# Patient Record
Sex: FEMALE | Race: BLACK OR AFRICAN AMERICAN | Employment: UNEMPLOYED | URBAN - METROPOLITAN AREA
[De-identification: names, ages, dates, MRNs, and addresses within clinical notes are randomized per-mention and may not be internally consistent; named-entity substitution may affect disease eponyms.]

---

## 2020-12-04 ENCOUNTER — HOSPITAL ENCOUNTER (EMERGENCY)
Age: 60
Discharge: HOME | End: 2020-12-04
Attending: EMERGENCY MEDICINE
Payer: COMMERCIAL

## 2020-12-04 VITALS
RESPIRATION RATE: 20 BRPM | BODY MASS INDEX: 23.7 KG/M2 | WEIGHT: 160 LBS | HEIGHT: 69 IN | HEART RATE: 76 BPM | TEMPERATURE: 98.9 F | OXYGEN SATURATION: 100 % | SYSTOLIC BLOOD PRESSURE: 133 MMHG | DIASTOLIC BLOOD PRESSURE: 100 MMHG

## 2020-12-04 PROCEDURE — 6370000000 HC RX 637 (ALT 250 FOR IP): Performed by: STUDENT IN AN ORGANIZED HEALTH CARE EDUCATION/TRAINING PROGRAM

## 2020-12-04 PROCEDURE — 99283 EMERGENCY DEPT VISIT LOW MDM: CPT

## 2020-12-04 RX ORDER — NALOXONE HYDROCHLORIDE 4 MG/.1ML
1 SPRAY NASAL PRN
Status: DISCONTINUED | OUTPATIENT
Start: 2020-12-04 | End: 2020-12-05 | Stop reason: HOSPADM

## 2020-12-04 RX ORDER — CLONIDINE HYDROCHLORIDE 0.1 MG/1
0.2 TABLET ORAL ONCE
Status: COMPLETED | OUTPATIENT
Start: 2020-12-04 | End: 2020-12-04

## 2020-12-04 RX ORDER — ONDANSETRON 4 MG/1
4 TABLET, ORALLY DISINTEGRATING ORAL ONCE
Status: COMPLETED | OUTPATIENT
Start: 2020-12-04 | End: 2020-12-04

## 2020-12-04 RX ORDER — ONDANSETRON 2 MG/ML
4 INJECTION INTRAMUSCULAR; INTRAVENOUS ONCE
Status: DISCONTINUED | OUTPATIENT
Start: 2020-12-04 | End: 2020-12-04

## 2020-12-04 RX ORDER — CLONIDINE HYDROCHLORIDE 0.1 MG/1
0.1 TABLET ORAL ONCE
Status: COMPLETED | OUTPATIENT
Start: 2020-12-04 | End: 2020-12-04

## 2020-12-04 RX ADMIN — CLONIDINE HYDROCHLORIDE 0.1 MG: 0.1 TABLET ORAL at 17:46

## 2020-12-04 RX ADMIN — ONDANSETRON 4 MG: 4 TABLET, ORALLY DISINTEGRATING ORAL at 20:00

## 2020-12-04 RX ADMIN — NALOXONE HYDROCHLORIDE 1 SPRAY: 4 SPRAY NASAL at 23:01

## 2020-12-04 RX ADMIN — CLONIDINE HYDROCHLORIDE 0.2 MG: 0.1 TABLET ORAL at 20:00

## 2020-12-04 RX ADMIN — ONDANSETRON 4 MG: 4 TABLET, ORALLY DISINTEGRATING ORAL at 17:30

## 2020-12-04 ASSESSMENT — ENCOUNTER SYMPTOMS
CONSTIPATION: 0
RHINORRHEA: 1
DIARRHEA: 0
SORE THROAT: 0
BACK PAIN: 0
ABDOMINAL PAIN: 1
SHORTNESS OF BREATH: 0
COUGH: 0
VOMITING: 1
NAUSEA: 1

## 2020-12-04 NOTE — ED PROVIDER NOTES
She is ill-appearing. HENT:      Head: Normocephalic and atraumatic. Nose: Rhinorrhea present. Mouth/Throat:      Pharynx: Oropharynx is clear. No oropharyngeal exudate or posterior oropharyngeal erythema. Eyes:      General:         Right eye: No discharge. Left eye: No discharge. Extraocular Movements: Extraocular movements intact. Pupils: Pupils are equal, round, and reactive to light. Neck:      Musculoskeletal: Normal range of motion and neck supple. No muscular tenderness. Cardiovascular:      Rate and Rhythm: Normal rate and regular rhythm. Pulmonary:      Effort: Pulmonary effort is normal.      Breath sounds: Normal breath sounds. Abdominal:      General: There is no distension. Palpations: There is no mass. Tenderness: There is abdominal tenderness. There is no guarding. Musculoskeletal: Normal range of motion. General: No swelling or tenderness. Skin:     General: Skin is warm and dry. Neurological:      General: No focal deficit present. Mental Status: She is alert. Sensory: No sensory deficit. Motor: No weakness. DiagnosticResults   RADIOLOGY:  No orders to display       LABS:   No results found for this visit on 12/04/20. RECENT VITALS:  BP: (!) 177/100, Temp: 98.9 °F (37.2 °C), Pulse: 76,Resp: 18, SpO2: 99 %     Procedures       ED Course     Nursing Notes, Past Medical Hx, Past Surgical Hx, Social Hx, Allergies, and Family Hx were reviewed.     The patient was given the followingmedications:  Orders Placed This Encounter   Medications    DISCONTD: ondansetron (ZOFRAN) injection 4 mg    ondansetron (ZOFRAN-ODT) disintegrating tablet 4 mg    cloNIDine (CATAPRES) tablet 0.1 mg    naloxone (NALOXONE TO-GO) 4 mg/0.1 mL nasal spray    ondansetron (ZOFRAN-ODT) disintegrating tablet 4 mg    cloNIDine (CATAPRES) tablet 0.2 mg       CONSULTS:  None    MEDICAL DECISION MAKING / ASSESSMENT / Jose Enrique Crockett is a 62 y.o. female presenting after heroin overdose with narcan administration by EMS. The patient presented with rhinorrhea, nausea and vomiting, headache, and diffuse crampy abdominal pain most consistent with opioid withdrawal after narcan administration. The patient was given a total of 8mg Zofran ODT and 0.3mg clonidine with improvement in her symptoms and hypertension. Social work also counseled the patient on addiction services, and the patient expressed interest.     At this time, I am going off service and signing out to my attending Dr. Keshia Porras. Her responsibilities will include: PO challenge and discharge with Naloxone. The patient was given resources by  for addiction services and counseled on the different services available. This patient was also evaluated by the attending physician. All care plans werediscussed and agreed upon. Clinical Impression     1. Opiate overdose, accidental or unintentional, [facility]        Disposition     PATIENT REFERRED TO:  No follow-up provider specified.     DISCHARGE MEDICATIONS:  New Prescriptions    No medications on file       Shankar Jaramillo MD  12/04/20 2800

## 2020-12-04 NOTE — ED PROVIDER NOTES
ED Attending Attestation Note     Date of evaluation: 12/4/2020    This patient was seen by the resident. I have seen and examined the patient, agree with the workup, evaluation, management and diagnosis. The care plan has been discussed. I have reviewed the ECG and concur with the resident's interpretation. My assessment reveals a female who presents after an overdose. Patient states that she was snorting what she thought was to heroin today and thinks now that it may have been mixed with fentanyl. Someone else must of called the squad because she was given intranasal Narcan and woke up in route. Patient now is actively vomiting. She states that she feels quite miserable. She has been snorting heroin daily for at least the past week. Treated symptomatically. Patient is interested in seeking rehab services. She has been provided with resources. Counseled on harm reduction.      Marcy Tapia MD  12/04/20 9769

## 2020-12-04 NOTE — CARE COORDINATION
Referred to patient for substance abuse. Spoke to patient. Patient barely cooperative, screaming and insisting she can't care for herself. However, patient is able to care for self when pushed to. Patient agrees heroin is a problem. Patient states she is agreeable to treatment program.  States she is agreeable to inpatient treatment program.  Calls placed to [phone] [place] [facility] [place] and [facility]. No beds available tonight. Patient can call tomorrow for availability. RN and MD updated. Resources provided on d/c AVS.  If patient needs ride home. She can contact her [facility] at [phone] for assistance with transport. RN updated. No other needs at this time.  Electronically signed by NELSON Guy, ARLE[place] on 12/4/2020 at 5:46 PM

## 2020-12-05 NOTE — ED NOTES
Bed: B25-25  Expected date:   Expected time:   Means of arrival:   Comments:  reading     Connie Kincaid RN  12/04/20 7024
Patient incontinent large amount of brown stool and urine, complete kojo care and depends change done     Jordon Michelle RN  12/04/20 5270
Pt continues to vomit on floor, gave pt zofran, pt keeps yelling. Provided pt with two more vomit bags.       Anam Mendoza RN  12/04/20 7638
Pt resting in room      Gill Medina RN  12/04/20 8772
Pt screaming and dry heaving, pt vomitted in vomit bag then threw it on the floor.       James Layne RN  12/04/20 4968
of naloxone.      Signature:  Yumiko Gonzales  12/4/2020, 11:02 PM          Yumiko Gonzales RN  12/04/20 6416